# Patient Record
Sex: MALE | Race: ASIAN | Employment: STUDENT | URBAN - METROPOLITAN AREA
[De-identification: names, ages, dates, MRNs, and addresses within clinical notes are randomized per-mention and may not be internally consistent; named-entity substitution may affect disease eponyms.]

---
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
## 2022-02-04 ENCOUNTER — TELEPHONE (OUTPATIENT)
Dept: INTERNAL MEDICINE CLINIC | Facility: CLINIC | Age: 22
End: 2022-02-04

## 2022-02-04 NOTE — TELEPHONE ENCOUNTER
Mother was requesting order due to family hx of heart disease, reports patient is otherwise healthy. Advised may not be covered, mother verbalized understanding, is asking if order can be approved and copy sent to [facility] and she will follow up with insurance about coverage or out of pocket cost. Pended order for lipid panel signed and sent via [employer].

## 2022-02-04 NOTE — TELEPHONE ENCOUNTER
I am not sure why she wants this checked. Typically we would never check a cholesterol level on someone this young. I have pended the order and we could do it. I cannot guarantee that the insurance will pay for it.

## 2022-03-01 ENCOUNTER — MED REC SCAN ONLY (OUTPATIENT)
Dept: ORTHOPEDICS CLINIC | Facility: CLINIC | Age: 22
End: 2022-03-01

## 2022-03-25 ENCOUNTER — TELEPHONE (OUTPATIENT)
Dept: ORTHOPEDICS CLINIC | Facility: CLINIC | Age: 22
End: 2022-03-25

## 2022-03-25 NOTE — TELEPHONE ENCOUNTER
New PT order placed.   Per Cele Beckwith at [facility] PT, faxed to [phone]  Confirmed fax sent

## 2022-03-25 NOTE — TELEPHONE ENCOUNTER
Patient did not start PT back in February because he was in school. Please send a new PT RX to [facility] @  Fax [phone]. Patient is coming for his 1st PT visit today. Thank you!

## 2022-05-06 ENCOUNTER — TELEPHONE (OUTPATIENT)
Dept: ORTHOPEDICS CLINIC | Facility: CLINIC | Age: 22
End: 2022-05-06

## 2022-05-06 NOTE — TELEPHONE ENCOUNTER
Please advise if OK to be seen for both the knee and ankle for same visit. Also, would you need new imaging of the knee, or eval first? Thank you!

## 2022-05-06 NOTE — TELEPHONE ENCOUNTER
Patient wants to schedule a follow up for his RT knee. Last visit was on 03/22/2021. He also wants to be seen for a new problem with his RT ankle. Please advise if patient can be seen at once for both body parts. If so, please advise if imaging will be needed. Thank you!

## 2022-05-09 NOTE — TELEPHONE ENCOUNTER
Nanda Post MD  You 3 days ago     Sounds good! Okay to see for both. No new knee imaging needed. Ankle XR recommended. Ankle xrays placed. Pt can be scheduled for both joints, per Dr. Tyrone Mayen. Thank you.

## 2022-05-18 ENCOUNTER — HOSPITAL ENCOUNTER (OUTPATIENT)
Dept: GENERAL RADIOLOGY | Age: 22
Discharge: HOME | End: 2022-05-18
Attending: ORTHOPAEDIC SURGERY
Payer: COMMERCIAL

## 2022-05-18 ENCOUNTER — OFFICE VISIT (OUTPATIENT)
Dept: ORTHOPEDICS CLINIC | Facility: CLINIC | Age: 22
End: 2022-05-18
Payer: COMMERCIAL

## 2022-05-18 DIAGNOSIS — Z98.890 S/P ACL RECONSTRUCTION: Primary | ICD-10-CM

## 2022-05-18 DIAGNOSIS — M25.572 LEFT ANKLE PAIN, UNSPECIFIED CHRONICITY: ICD-10-CM

## 2022-05-18 DIAGNOSIS — S93.492A SPRAIN OF ANTERIOR TALOFIBULAR LIGAMENT OF LEFT ANKLE, INITIAL ENCOUNTER: ICD-10-CM

## 2022-05-18 PROCEDURE — 73610 X-RAY EXAM OF ANKLE: CPT | Performed by: ORTHOPAEDIC SURGERY

## 2022-05-18 PROCEDURE — 99214 OFFICE O/P EST MOD 30 MIN: CPT | Performed by: ORTHOPAEDIC SURGERY

## 2023-01-13 ENCOUNTER — TELEPHONE (OUTPATIENT)
Dept: INTERNAL MEDICINE CLINIC | Facility: CLINIC | Age: 23
End: 2023-01-13

## 2023-01-13 NOTE — TELEPHONE ENCOUNTER
On call     Per patient mother  , son is present at the time of conversation , he had some stomach ache this morning and took  antacid medication so felt better  , but now he is experiencing some rash under both armpits ,mostly on the left side that is spreading down on the rib area-the    rad rash fadumo arm area left arm - forearm -Per patient arm feels feeling heavy / have some  numbness .   Was exercising today and feels  recommend patient to go to immediate care for evaluation  And treatment patient mother agrees and will take patient to  immediate care now -->  ER if indicated

## 2023-02-28 RX ORDER — MONTELUKAST SODIUM 10 MG/1
10 TABLET ORAL NIGHTLY
Qty: 90 TABLET | Refills: 1 | Status: SHIPPED | OUTPATIENT
Start: 2023-02-28

## 2023-02-28 RX ORDER — FLUTICASONE PROPIONATE 50 MCG
2 SPRAY, SUSPENSION (ML) NASAL DAILY
Qty: 3 EACH | Refills: 1 | Status: SHIPPED | OUTPATIENT
Start: 2023-02-28

## 2023-02-28 NOTE — TELEPHONE ENCOUNTER
Mom requesting refills for pt from Dr Karo Bruno of:    Singulair   Flonase    Send to:  [facility] [address] [facility], [address] [facility],[address]

## 2023-02-28 NOTE — TELEPHONE ENCOUNTER
Per mom of patient she made an appointment for patient on 03/16/2023 during his spring break but for the meantime patient would like to know if can send rx med until he can see APRN on 03/16/2023.

## 2023-02-28 NOTE — TELEPHONE ENCOUNTER
Refill passed per [address] [place] [address] protocol. Requested Prescriptions   Pending Prescriptions Disp Refills    montelukast (SINGULAIR) 10 MG Oral Tab 90 tablet 1     Sig: Take 1 tablet (10 mg total) by mouth nightly. Asthma & COPD Medication Protocol Passed - 2023  2:24 PM        Passed - In person appointment or virtual visit in the past 6 mos or appointment in next 3 mos     Recent Outpatient Visits              9 months ago S/P ACL reconstruction    [address], [place] Timmy Hensley MD    Office Visit    1 year ago [facility] health care    [address], [address], [place]    Nurse Only    1 year ago Cerumen debris on tympanic membrane of both ears    [address] [facility] [place], [place], APR    Office Visit    1 year ago Routine physical examination    Sahil Griggs MD    Office Visit    1 year ago S/P ACL reconstruction    [address], [place] Darcy Hensley MD    Office Visit          Future Appointments         Provider Department Appt Notes    In 2 weeks KALPANA Ibarra [facility], [address] [facility] px \"policy informed\"                 fluticasone propionate (FLONASE) 50 MCG/ACT Nasal Suspension 3 each 3     Si sprays by Nasal route daily.        Allergy Medication Protocol Passed - 2023  2:24 PM        Passed - In person appointment or virtual visit in the past 12 mos or appointment in next 3 mos     Recent Outpatient Visits              9 months ago S/P ACL reconstruction    [address], [place] Timmy Hensley MD    Office Visit    1 year ago [facility] care    [address], [address] [facility], [place]    Nurse Only    1 year ago Cerumen debris on tympanic membrane of both ears    [address] [facility] [place], [place] Vásquez, KALPANA    Office Visit    1 year ago Routine physical examination    [address] [facility], Tone Zamora MD    Office Visit    1 year ago S/P ACL reconstruction    [address] [place] Luis Alberto Hensley MD    Office Visit          Future Appointments         Provider Department Appt Notes    In 2 weeks KALPANA [facility], [address] [facility] [place], [place] px Adrian Webber informed\"

## 2023-10-05 ENCOUNTER — TELEPHONE (OUTPATIENT)
Dept: INTERNAL MEDICINE CLINIC | Facility: CLINIC | Age: 23
End: 2023-10-05

## 2023-10-05 NOTE — TELEPHONE ENCOUNTER
[facility]    Requesting Dr Wolfe order 4 labs for pt:    Cholesterol  Ferritin  Iron  Vitamin D

## 2023-10-06 NOTE — TELEPHONE ENCOUNTER
Patient needs to schedule a visit with Dr Wolfe and lab work can be discussed at the time of the visit.

## 2023-10-06 NOTE — TELEPHONE ENCOUNTER
Left message to call back
MOM RT RN CALL
Mom states that pt needs meningitis vaccine before wcc, okay to book?
Needs to come for meningitis vaccine this month explained can wait and get at well visit but he needs so he can get forms turned in for this he can get forms for [employer] ,ok to give as nurse visit, ordered  Per protocol, will need to eat/drink prior to
urinary symptoms

## 2024-02-15 ENCOUNTER — OFFICE VISIT (OUTPATIENT)
Dept: INTERNAL MEDICINE CLINIC | Facility: CLINIC | Age: 24
End: 2024-02-15

## 2024-02-15 VITALS
OXYGEN SATURATION: 100 % | DIASTOLIC BLOOD PRESSURE: 75 MMHG | SYSTOLIC BLOOD PRESSURE: 125 MMHG | TEMPERATURE: 97 F | BODY MASS INDEX: 26.04 KG/M2 | HEIGHT: 71 IN | HEART RATE: 74 BPM | WEIGHT: 186 LBS

## 2024-02-15 DIAGNOSIS — J30.9 ALLERGIC RHINITIS, UNSPECIFIED SEASONALITY, UNSPECIFIED TRIGGER: Primary | ICD-10-CM

## 2024-02-15 DIAGNOSIS — R05.1 ACUTE COUGH: ICD-10-CM

## 2024-02-15 PROCEDURE — 99213 OFFICE O/P EST LOW 20 MIN: CPT | Performed by: NURSE PRACTITIONER

## 2024-02-15 PROCEDURE — 3074F SYST BP LT 130 MM HG: CPT | Performed by: NURSE PRACTITIONER

## 2024-02-15 PROCEDURE — 3078F DIAST BP <80 MM HG: CPT | Performed by: NURSE PRACTITIONER

## 2024-02-15 PROCEDURE — 3008F BODY MASS INDEX DOCD: CPT | Performed by: NURSE PRACTITIONER

## 2024-02-15 RX ORDER — FLUTICASONE PROPIONATE 50 MCG
2 SPRAY, SUSPENSION (ML) NASAL DAILY
Qty: 3 EACH | Refills: 1 | Status: SHIPPED | OUTPATIENT
Start: 2024-02-15

## 2024-02-15 RX ORDER — MONTELUKAST SODIUM 10 MG/1
10 TABLET ORAL DAILY
Qty: 90 TABLET | Refills: 1 | Status: SHIPPED | OUTPATIENT
Start: 2024-02-15

## 2024-02-15 NOTE — PATIENT INSTRUCTIONS
Plan    Zyrtec 10mg at night    Simply saline mist at night    Flonase in the morning    Singulair in the morning

## 2024-02-15 NOTE — PROGRESS NOTES
HPI:    Patient ID: Jesica Mendez is a 24 year old male.    HPI Cough  24 year old male complaining of cough.  In December he was ill. Since then he has been clearing his throat and sometimes coughing with a congested cough.    He saw ENT (Dr. Souza)- He was put on the following:  He was on singular and flonase has helped in the past.      Immunization History   Administered Date(s) Administered    Covid-19 Vaccine Pfizer 30 mcg/0.3 ml 04/09/2021, 04/30/2021    DTAP 03/12/2002, 05/06/2002, 08/27/2002, 06/24/2003, 04/17/2007    DTAP INFANRIX 03/12/2002, 05/06/2002, 08/27/2002, 06/24/2003, 04/17/2007    FLULAVAL 6 months & older 0.5 ml Prefilled syringe (50061) 04/04/2019, 10/09/2020    FLUMIST Intranasal Influenza 11/20/2014    FLUMIST NASAL 2 YR-49 YRS (67494) 10/12/2013, 11/20/2014    FLUZONE 6 months and older PFS 0.5 ml (07243) 04/04/2019, 10/09/2020    HEP A 11/29/2005, 04/17/2007    HEP A,Ped/Adol,(2 Dose) 04/17/2007    HEP B 02/12/2002, 03/12/2002, 08/27/2002    HEP B, Adult 06/14/2021    HEP B, Ped/Adol 02/12/2002, 03/12/2002, 08/27/2002    HIB 03/12/2002, 05/06/2002, 08/27/2002, 05/03/2003    Hib, Unspecified Formulation 03/12/2002, 05/06/2002, 08/27/2002, 05/03/2003    IPV 03/12/2002, 05/06/2002, 08/27/2002, 04/17/2007    Influenza 11/08/2005, 10/06/2009, 10/06/2012    Influenza Vaccine, Preserv Free 11/08/2005, 10/06/2009, 10/12/2013    MMR 05/03/2003, 05/03/2003, 04/17/2007, 04/17/2007    Meningococcal (Menomune) 10/12/2013    Meningococcal-Menactra 03/15/2018    Meningococcal-Menveo 2month-55yr 10/12/2013, 03/15/2018    Pneumococcal (Prevnar 7) 04/09/2002, 06/17/2002, 10/24/2002, 06/24/2003    Pneumococcal Vaccine, Conjugate 04/09/2002, 06/17/2002, 10/24/2002, 06/24/2003    TDAP 10/06/2012    Varicella 02/03/2003, 04/17/2007       Past Medical History:   Diagnosis Date    Allergic rhinitis     Finger fracture, left 2015    Foreign body in ear 2008    per ng      Past Surgical History:   Procedure  Laterality Date    Other surgical history      L finger fracture, pinned    Other surgical history Left 12/18/2020    RT ACL W/ MD DARYA      Social History     Socioeconomic History    Marital status: Single   Tobacco Use    Smoking status: Passive Smoke Exposure - Never Smoker    Smokeless tobacco: Never   Vaping Use    Vaping Use: Never used   Substance and Sexual Activity    Alcohol use: Never    Drug use: Never    Sexual activity: Not Currently     Partners: Female   Other Topics Concern    Pt has a pacemaker No    Pt has a defibrillator No    Reaction to local anesthetic No          Review of Systems   Constitutional:  Negative for chills, fatigue and fever.   HENT:  Negative for ear pain, hearing loss, sinus pain, sore throat and trouble swallowing.    Eyes:  Negative for pain and visual disturbance.   Respiratory:  Positive for cough. Negative for chest tightness and shortness of breath.    Cardiovascular:  Negative for chest pain, palpitations and leg swelling.   Gastrointestinal:  Negative for abdominal pain, constipation, diarrhea, nausea and vomiting.   Endocrine: Negative for cold intolerance and heat intolerance.   Genitourinary:  Negative for dysuria and hematuria.   Musculoskeletal:  Negative for back pain and joint swelling.   Skin:  Negative for rash.   Allergic/Immunologic: Negative for environmental allergies.   Neurological:  Negative for weakness, numbness and headaches.   Hematological:  Does not bruise/bleed easily.   Psychiatric/Behavioral:  Negative for dysphoric mood and sleep disturbance. The patient is not nervous/anxious.               Current Outpatient Medications   Medication Sig Dispense Refill    montelukast (SINGULAIR) 10 MG Oral Tab Take 1 tablet (10 mg total) by mouth daily. 90 tablet 1    fluticasone propionate (FLONASE) 50 MCG/ACT Nasal Suspension 2 sprays by Nasal route daily. 3 each 1     Allergies:No Known Allergies   PHYSICAL EXAM:   Physical Exam  Constitutional:        Appearance: Normal appearance. He is well-developed.   HENT:      Head: Normocephalic.      Right Ear: Tympanic membrane normal.      Left Ear: Tympanic membrane normal.      Nose: Nose normal.      Mouth/Throat:      Mouth: Mucous membranes are moist.      Pharynx: No oropharyngeal exudate or posterior oropharyngeal erythema.   Eyes:      General:         Right eye: No discharge.         Left eye: No discharge.      Pupils: Pupils are equal, round, and reactive to light.   Cardiovascular:      Rate and Rhythm: Normal rate and regular rhythm.      Heart sounds: Normal heart sounds. No murmur heard.     No friction rub. No gallop.   Pulmonary:      Effort: Pulmonary effort is normal. No respiratory distress.      Breath sounds: Normal breath sounds. No wheezing, rhonchi or rales.   Abdominal:      General: Bowel sounds are normal. There is no distension.      Palpations: Abdomen is soft. There is no mass.      Tenderness: There is no abdominal tenderness. There is no right CVA tenderness, left CVA tenderness or guarding.   Musculoskeletal:         General: No tenderness.      Cervical back: Normal range of motion and neck supple. No tenderness.      Right lower leg: No edema.      Left lower leg: No edema.   Lymphadenopathy:      Cervical: No cervical adenopathy.   Skin:     General: Skin is warm and dry.      Findings: No rash.   Neurological:      Mental Status: He is alert and oriented to person, place, and time.      Coordination: Coordination normal.      Gait: Gait normal.   Psychiatric:         Mood and Affect: Mood normal.         Behavior: Behavior normal.         Thought Content: Thought content normal.         Judgment: Judgment normal.       /75 (BP Location: Left arm, Patient Position: Sitting, Cuff Size: adult)   Pulse 74   Temp 96.6 °F (35.9 °C) (Temporal)   Ht 5' 11\" (1.803 m)   Wt 186 lb (84.4 kg)   SpO2 100%   BMI 25.94 kg/m²   Wt Readings from Last 2 Encounters:   02/15/24 186 lb (84.4  kg)   21 177 lb 8 oz (80.5 kg) (80%, Z= 0.83)*     * Growth percentiles are based on CDC (Boys, 2-20 Years) data.     Body mass index is 25.94 kg/m².(2)  No results found for: \"WBC\", \"RBC\", \"HGB\", \"HCT\", \"MCV\", \"MCH\", \"MCHC\", \"RDW\", \"PLT\", \"MPV\"   No results found for: \"GLU\", \"BUN\", \"BUNCREA\", \"CREATSERUM\", \"ANIONGAP\", \"GFR\", \"GFRNAA\", \"GFRAA\", \"CA\", \"OSMOCALC\", \"ALKPHO\", \"AST\", \"ALT\", \"ALKPHOS\", \"BILT\", \"TP\", \"ALB\", \"GLOBULIN\", \"AGRATIO\", \"NA\", \"K\", \"CL\", \"CO2\"   No results found for: \"EAG\", \"A1C\"   No results found for: \"CHOLEST\", \"TRIG\", \"HDL\", \"LDL\", \"VLDL\", \"TCHDLRATIO\", \"NONHDLC\", \"CHOLHDLRATIO\", \"CALCNONHDL\"   No results found for: \"T4F\", \"TSH\", \"TSHT4\"             ASSESSMENT/PLAN:     Problem List Items Addressed This Visit       Acute cough     Cough- Most likely allergies.    Plan    Zyrtec 10mg at night    Simply saline mist at night    Flonase in the morning    Singulair in the morning    Labs- Food allergy panel and CBC, CMP    Patient to call if symptoms do not improve.  Consider chest x-ray if cough continues.         Relevant Medications    montelukast (SINGULAIR) 10 MG Oral Tab    Allergic rhinitis - Primary    Relevant Medications    montelukast (SINGULAIR) 10 MG Oral Tab    fluticasone propionate (FLONASE) 50 MCG/ACT Nasal Suspension    Other Relevant Orders    Adult Food Allergy Prof [E]    CBC, Platelet, No Differential [E]    Lipid Panel [E]    Comp Metabolic Panel (14)          Orders Placed This Encounter   Procedures    Adult Food Allergy Prof [E]    CBC, Platelet, No Differential [E]    Lipid Panel [E]    Comp Metabolic Panel (14)       Meds This Visit:  Requested Prescriptions     Signed Prescriptions Disp Refills    montelukast (SINGULAIR) 10 MG Oral Tab 90 tablet 1     Sig: Take 1 tablet (10 mg total) by mouth daily.    fluticasone propionate (FLONASE) 50 MCG/ACT Nasal Suspension 3 each 1     Si sprays by Nasal route daily.       Imaging & Referrals:  None         Juana Haas,  APRN

## 2024-02-16 NOTE — ASSESSMENT & PLAN NOTE
Called pt. Cough- Most likely allergies.    Plan    Zyrtec 10mg at night    Simply saline mist at night    Flonase in the morning    Singulair in the morning    Labs- Food allergy panel and CBC, CMP    Patient to call if symptoms do not improve.  Consider chest x-ray if cough continues.

## 2024-02-28 ENCOUNTER — LAB ENCOUNTER (OUTPATIENT)
Dept: LAB | Facility: HOSPITAL | Age: 24
End: 2024-02-28
Attending: NURSE PRACTITIONER
Payer: COMMERCIAL

## 2024-02-28 LAB
ALBUMIN SERPL-MCNC: 4.6 G/DL (ref 3.2–4.8)
ALBUMIN/GLOB SERPL: 1.5 {RATIO} (ref 1–2)
ALP LIVER SERPL-CCNC: 61 U/L
ALT SERPL-CCNC: 33 U/L
ANION GAP SERPL CALC-SCNC: 4 MMOL/L (ref 0–18)
AST SERPL-CCNC: 16 U/L (ref ?–34)
BILIRUB SERPL-MCNC: 0.5 MG/DL (ref 0.3–1.2)
BUN BLD-MCNC: 14 MG/DL (ref 9–23)
BUN/CREAT SERPL: 14 (ref 10–20)
CALCIUM BLD-MCNC: 9.5 MG/DL (ref 8.7–10.4)
CHLORIDE SERPL-SCNC: 108 MMOL/L (ref 98–112)
CO2 SERPL-SCNC: 30 MMOL/L (ref 21–32)
CREAT BLD-MCNC: 1 MG/DL
DEPRECATED RDW RBC AUTO: 35.6 FL (ref 35.1–46.3)
EGFRCR SERPLBLD CKD-EPI 2021: 109 ML/MIN/1.73M2 (ref 60–?)
ERYTHROCYTE [DISTWIDTH] IN BLOOD BY AUTOMATED COUNT: 11.8 % (ref 11–15)
FASTING STATUS PATIENT QL REPORTED: NO
GLOBULIN PLAS-MCNC: 3.1 G/DL (ref 2.8–4.4)
GLUCOSE BLD-MCNC: 97 MG/DL (ref 70–99)
HCT VFR BLD AUTO: 44.6 %
HGB BLD-MCNC: 15.1 G/DL
MCH RBC QN AUTO: 28.2 PG (ref 26–34)
MCHC RBC AUTO-ENTMCNC: 33.9 G/DL (ref 31–37)
MCV RBC AUTO: 83.2 FL
OSMOLALITY SERPL CALC.SUM OF ELEC: 294 MOSM/KG (ref 275–295)
PLATELET # BLD AUTO: 227 10(3)UL (ref 150–450)
POTASSIUM SERPL-SCNC: 4.1 MMOL/L (ref 3.5–5.1)
PROT SERPL-MCNC: 7.7 G/DL (ref 5.7–8.2)
RBC # BLD AUTO: 5.36 X10(6)UL
SODIUM SERPL-SCNC: 142 MMOL/L (ref 136–145)
WBC # BLD AUTO: 8.7 X10(3) UL (ref 4–11)

## 2024-02-28 PROCEDURE — 36415 COLL VENOUS BLD VENIPUNCTURE: CPT | Performed by: NURSE PRACTITIONER

## 2024-02-28 PROCEDURE — 86003 ALLG SPEC IGE CRUDE XTRC EA: CPT | Performed by: NURSE PRACTITIONER

## 2024-02-28 PROCEDURE — 80053 COMPREHEN METABOLIC PANEL: CPT | Performed by: NURSE PRACTITIONER

## 2024-02-28 PROCEDURE — 85027 COMPLETE CBC AUTOMATED: CPT | Performed by: NURSE PRACTITIONER

## 2024-02-28 PROCEDURE — 82785 ASSAY OF IGE: CPT | Performed by: NURSE PRACTITIONER

## 2024-02-29 LAB
ALLERGEN BRAZIL NUT: <0.1 KUA/L (ref ?–0.1)
ALMOND IGE QN: <0.1 KUA/L (ref ?–0.1)
CASHEW NUT IGE QN: <0.1 KUA/L (ref ?–0.1)
CLAM IGE QN: <0.1 KUA/L (ref ?–0.1)
CODFISH IGE QN: <0.1 KUA/L (ref ?–0.1)
CORN IGE QN: <0.1 KUA/L (ref ?–0.1)
COW MILK IGE QN: <0.1 KUA/L (ref ?–0.1)
EGG WHITE IGE QN: <0.1 KUA/L (ref ?–0.1)
HAZELNUT IGE QN: <0.1 KUA/L (ref ?–0.1)
IGE SERPL-ACNC: 58.2 KU/L (ref 2–214)
PEANUT IGE QN: <0.1 KUA/L (ref ?–0.1)
SALMON IGE QN: <0.1 KUA/L (ref ?–0.1)
SCALLOP IGE QN: <0.1 KUA/L (ref ?–0.1)
SESAME SEED IGE QN: <0.1 KUA/L (ref ?–0.1)
SHRIMP IGE QN: <0.1 KUA/L (ref ?–0.1)
SOYBEAN IGE QN: <0.1 KUA/L (ref ?–0.1)
WALNUT IGE QN: <0.1 KUA/L (ref ?–0.1)
WHEAT IGE QN: <0.1 KUA/L (ref ?–0.1)

## (undated) DIAGNOSIS — M25.571 RIGHT ANKLE PAIN, UNSPECIFIED CHRONICITY: Primary | ICD-10-CM

## (undated) DIAGNOSIS — Z98.890 S/P ACL RECONSTRUCTION: Primary | ICD-10-CM

## (undated) DIAGNOSIS — Z13.220 SCREENING CHOLESTEROL LEVEL: Primary | ICD-10-CM

## (undated) DEVICE — DUAL SPIKE ADAPTER: Brand: CONMED

## (undated) DEVICE — LAPAROTOMY SPONGE - RF AND X-RAY DETECTABLE PRE-WASHED: Brand: SITUATE

## (undated) DEVICE — FAST-FIX 360 STRAIGHT KNOT                                    PUSHER/CUTTER AND SLOTTED CANNULA SETS: Brand: FAST-FIX

## (undated) DEVICE — SPONGE RAYTEC 4X4 RF DETECT

## (undated) DEVICE — MEDI-VAC NON-CONDUCTIVE SUCTION TUBING: Brand: CARDINAL HEALTH

## (undated) DEVICE — 7 MM CLEAR-TRAC SCREW CANNULA, LATEX-FREE: Brand: CLEAR-TRAC

## (undated) DEVICE — ACL GRAFT KNIFE 10MM

## (undated) DEVICE — BLADE SAW KM33-11

## (undated) DEVICE — Device

## (undated) DEVICE — [AGGRESSIVE PLUS CUTTER, ARTHROSCOPIC SHAVER BLADE,  DO NOT RESTERILIZE,  DO NOT USE IF PACKAGE IS DAMAGED,  KEEP DRY,  KEEP AWAY FROM SUNLIGHT]: Brand: FORMULA

## (undated) DEVICE — KNEE ARTHROSCOPY CDS: Brand: MEDLINE INDUSTRIES, INC.

## (undated) DEVICE — SUTURE FIBERLOOP #2

## (undated) DEVICE — VIOLET BRAIDED (POLYGLACTIN 910), SYNTHETIC ABSORBABLE SUTURE: Brand: COATED VICRYL

## (undated) DEVICE — SUTURE ETHIBOND 2 OS-4

## (undated) DEVICE — SUTURE VICRYL 3-0 SH

## (undated) DEVICE — COVER,MAYO STAND,STERILE: Brand: MEDLINE

## (undated) DEVICE — SUTURE TIGERLOOP #2

## (undated) DEVICE — STERILE HOOK LOCK LATEX FREE ELASTIC BANDAGE 6INX5YD: Brand: HOOK LOCK™

## (undated) DEVICE — MEDI-VAC SUCTION HANDLE REGULAR CAPACITY: Brand: CARDINAL HEALTH

## (undated) DEVICE — STERILE POLYISOPRENE POWDER-FREE SURGICAL GLOVES WITH EMOLLIENT COATING: Brand: PROTEXIS

## (undated) DEVICE — #15 STERILE STAINLESS BLADE: Brand: STERILE STAINLESS BLADES

## (undated) DEVICE — DRESSING AQUACEL AG 3.5 X 6

## (undated) DEVICE — ACL ADD ON PACK-LF: Brand: MEDLINE INDUSTRIES, INC.

## (undated) DEVICE — #10 STERILE BLADE: Brand: POLYMER COATED BLADES

## (undated) DEVICE — SUTURE MONOCRYL 3-0 PS-2

## (undated) DEVICE — PADDING CAST COTTON  4

## (undated) DEVICE — ELECTRODE TUNG MICRO STR 3CM

## (undated) DEVICE — NOVOCUT SUTURE MANAGER: Brand: NOVOCUT

## (undated) DEVICE — TUBING IRR 16FT CNT WV 3 ASCP

## (undated) DEVICE — 1.2 RAP-PAC B LATEX FREE: Brand: RAP-PAC

## (undated) DEVICE — HOOK LOCK LATEX FREE ELASTIC BANDAGE 4INX5YD

## (undated) DEVICE — SUTURE FIBERWIRE S AR-7200

## (undated) DEVICE — DERMABOND LIQUID ADHESIVE

## (undated) DEVICE — ZIMMER® STERILE DISPOSABLE TOURNIQUET CUFF WITH PLC, DUAL PORT, SINGLE BLADDER, 34 IN. (86 CM)

## (undated) DEVICE — 2.4 MM FLEXIBLE PASSING PINS,                                    STERILE 5 PER PACKAGE: Brand: ENDOBUTTON

## (undated) DEVICE — SUTURE VICRYL 0 UR-6

## (undated) DEVICE — BULB SYRINGE,IRRIGATION WITH PROTECTIVE CAP: Brand: DOVER

## (undated) DEVICE — 2.4 MM X 12 INCH DRILL-TIP GUIDE WIRE

## (undated) DEVICE — DEVICE FLUID REMOVAL-WATER BUG

## (undated) DEVICE — KENDALL SCD EXPRESS SLEEVES, KNEE LENGTH, MEDIUM: Brand: KENDALL SCD

## (undated) DEVICE — STERILE SYNTHETIC POLYISOPRENE POWDER-FREE SURGICAL GLOVES WITH HYDROGEL COATING, SMOOTH FINISH, STRAIGHT FINGER: Brand: PROTEXIS

## (undated) DEVICE — [RESECTOR CUTTER, ARTHROSCOPIC SHAVER BLADE,  DO NOT RESTERILIZE,  DO NOT USE IF PACKAGE IS DAMAGED,  KEEP DRY,  KEEP AWAY FROM SUNLIGHT]: Brand: FORMULA

## (undated) DEVICE — TOWEL OR BLU 16X26 STRL

## (undated) DEVICE — 3M™ STERI-STRIP™ REINFORCED ADHESIVE SKIN CLOSURES, R1547, 1/2 IN X 4 IN (12 MM X 100 MM), 6 STRIPS/ENVELOPE: Brand: 3M™ STERI-STRIP™

## (undated) DEVICE — SUPER TURBOVAC 90 IFS: Brand: COBLATION

## (undated) DEVICE — AGGRESSIVE PLUS, ANGLED CUTTER: Brand: FORMULA

## (undated) DEVICE — 3M™ IOBAN™ 2 ANTIMICROBIAL INCISE DRAPE 6648EZ: Brand: IOBAN™ 2

## (undated) DEVICE — ACL GRAFT KNIFE 9MM

## (undated) DEVICE — SOL  .9 3000ML

## (undated) DEVICE — TUBING DW OUTFLOW

## (undated) DEVICE — 3M™ STERI-DRAPE™ INSTRUMENT POUCH 1018: Brand: STERI-DRAPE™

## (undated) DEVICE — CAUTERY PENCIL

## (undated) DEVICE — SUTURE ETHIBOND 5 V-37

## (undated) DEVICE — UNDYED BRAIDED (POLYGLACTIN 910), SYNTHETIC ABSORBABLE SUTURE: Brand: COATED VICRYL

## (undated) DEVICE — STERILE POLYISOPRENE POWDER-FREE SURGICAL GLOVES: Brand: PROTEXIS

## (undated) DEVICE — 3M™ TEGADERM™ TRANSPARENT FILM DRESSING, 1626W, 4 IN X 4-3/4 IN (10 CM X 12 CM), 50 EACH/CARTON, 4 CARTON/CASE: Brand: 3M™ TEGADERM™

## (undated) NOTE — MR AVS SNAPSHOT
[place]  [address]  [phone]               Thank you for choosing us for your health care visit with Julieta Zavala MD.  We are glad to serve you and happy to provide you with this summa reimbursement difficulties for you.         Single wart R forehead; flat warts behind ear    Assoc Dx:  Filiform wart [B07.9]          Reason for Today's Visit     Rash           Medical Issues Discussed Today     Filiform wart    -  Primary    Closed comed For medical emergencies, dial 911.                Visit [facility] online at  [email or web address]tn

## (undated) NOTE — MR AVS SNAPSHOT
[facility] [facility]  [phone]               Thank you for choosing us for your health care visit with Lavern Salmon DO.   We are glad to serve you and happy to provide you with this sum Fact Sheet: Healthy Active Living for Families    Healthy nutrition starts as early as infancy with breastfeeding. Once your baby begins eating solid foods, introduce nutritious foods early on and often.  Sometimes toddlers need to try a food 10 times befor

## (undated) NOTE — LETTER
[facility] [employer] of Child Health Examination       Student's Name  Rhonda Velásquez Signature                                                                                                                                   Title                           Date     Signature Male School   Grade Level/ID#  12th Grade   HEALTH HISTORY          TO BE COMPLETED AND SIGNED BY PARENT/GUARDIAN AND VERIFIED BY HEALTH CARE PROVIDER    ALLERGIES  (Food, drug, insect, other)  Patient has no known allergies.  MEDICATION  (List all prescrib PHYSICAL EXAMINATION REQUIREMENTS    Entire section below to be completed by MD/DO/APN/PA       PHYSICAL EXAMINATION REQUIREMENTS (head circumference if <33 years old):   /67   Pulse 65   Ht 5' 11\" (1.803 m)   Wt 68.1 kg (150 lb 3.2 oz)   BMI 20.95 Mouth/Dental Yes  Spinal examination Yes    Cardiovascular/HTN Yes  Nutritional status Yes    Respiratory Yes                   Diagnosis of Asthma: No Mental Health Yes        Currently Prescribed Asthma Medication:            Quick-relief  medication (e.

## (undated) NOTE — LETTER
Name:  Terrence Kaplan Year:  12th Grade Class: Student ID No.:   Address:  [address] [place] [address] Phone:  [phone] (home)  :  14year old   Name Relationship [address] Work Phone Home Phone Mobile Phone   1. John Echevarria 13. Does anyone in your family have a heart problem, pacemaker, or implanted defibrillator? 12. Has anyone in your family had unexplained fainting, seizures, or near drowning?      BONE AND JOINT QUESTIONS Yes No   17. Have you ever had an injury to a b after being hit or falling? 39.Have you ever been unable to move your arms / legs after being hit /fall? 40. Have you ever become ill while exercising in the heat?     41. Do you get frequent muscle cramps when exercising? 42.  Do you or someone · Murmurs (auscultation standing, supine, +/- Valsalva)  · Location of point of maximal impulse (PMI) Yes    Pulses Yes    Lungs Yes    Abdomen Yes    Genitourinary (males only)* Yes    Skin:  HSV, lesions suggestive of MRSA, tinea corporis Yes    Neurolog may be asked to submit to testing for the presence of performance-enhancing substances in my/his/her body either during [employer] state series events or during the school day, and I/our student do/does hereby agree to submit to such testing and analysis by a ce

## (undated) NOTE — LETTER
[address] of 1   Account #:   Order Date: 2/4/2022           EPIC ORD #:   Order Time:  5:41 PM               Client / Ordering Site Information: Physician Information:   Account Name:     Address 1:     Address 2:     [place], [place] Zip:     Phone:     Fax:      Ordering: Vania Cha   Degree: MD   NPI: [id number]   UPIN:     Physician ID:           Patient Information:   Name: Nieves Harada   Gender: Male   SSN: [id number]   Patient ID: [id number]    YOB: 2000 (22 years)   Phone: [phone]   Address: [place] [phone] Insurance: COMMERCIAL Sec.  Insurance: MEDICAID   Ins Code: COMMERCIAL Ins Code: MEDICAID   Plan: COMMERCIAL Plan: MEDICAID   Address: No address on file Address: [address] [facility] [address]   Policy #: [id number] Group #: [id number] Policy #: [id number] Group #: [id number]        Order: LIPID PANEL (LIPID)   Order Priority: Routine   Order class: Normal   Specimen source:   Comments:   Status:   Count:    Interval:   Questions:         Responsible Party / Guarantor Information:   Name: Nieves Harada   Address: [address] [place] [address] Zip: [address]   Phone: [phone]   Relation to Pt: Self   Employer Name:           ABN:      Worker's Comp: N Date of Injury:              Diagnosis Codes:   F18.732                   SIGNED ORDER ON FILE  Authorizing Provider: Vania Cha MD   Date: Feb 4, 2022 at 5:41 PM  Ordering Department: [facility] NURSE TRIAGE [facility]

## (undated) NOTE — LETTER
5/2/2018              Belem Barclay        [address] [address]         Immunization History  Administered    Dates Administered   .  Meningococcal (Menactra/Menveo)                   10/12/2013,  03/15/2018      Sincerely,    Nikko

## (undated) NOTE — Clinical Note
[facility] [employer] Child Health Examination       Student's Name  Zacarias Bending Birth Title                           Date    (If adding dates to the above immunization history section, put your initials by date(s) and sign here.)   ALTERNATIVE PROOF OF IMMUNITY   1 Diagnosis of asthma? Child wakes during the night coughing   Yes       No    Yes       No    Loss of function of one of paired organs? (eye/ear/kidney/testicle)   Yes       No      Birth Defects? Developmental delay?    Yes       No    Yes       No  Hospi Family History                    Ethnic Minority                             Signs of Insulin Resistance (hypertension, dyslipidemia, polycystic ovarian syndrome, acanthosis nigricans)                           At Risk     Lead Risk Questionnaire  Req'd f NEEDS/MODIFICATIONS required in the school setting  None DIETARY Needs/Restrictions     None   SPECIAL INSTRUCTIONS/DEVICES e.g. safety glasses, glass eye, chest protector for arrhythmia, pacemaker, prosthetic device, dental bridge, false teeth, athleticsu

## (undated) NOTE — MR AVS SNAPSHOT
[facility] [place] [address] [address] [id number] [phone]               Thank you for choosing us for your health care visit with Quentin Little MD.  We are glad to serve you and happy to provide you with this summary of

## (undated) NOTE — Clinical Note
Name:  Melissa Chirinos  School Year:  11th Grade Class: Student ID No.:   Address:  [place] [address] Phone:  [phone] (home)  :  15year old   Name Relationship [address] Work Phone Home Phone Mobile Phone   1. Prmaod Rees 12. Has anyone in your family had unexplained fainting, seizures, or near drowning? BONE AND JOINT QUESTIONS Yes No   17. Have you ever had an injury to a bone, muscle, ligament, or tendon that caused you to miss a practice or a game?      18. Have you /fall?     36. Have you ever become ill while exercising in the heat?     41. Do you get frequent muscle cramps when exercising? 42. Do you or someone in your family have sickle cell trait or disease? 43.  Have you ever had any problems with your ey Abdomen Yes    Genitourinary (males only)* {N/A:2593}    Skin:  HSV, lesions suggestive of MRSA, tinea corporis Yes    Neurologic* Yes    MUSCULOSKELETAL     Neck Yes    Back Yes    Shoulder/arm Yes    Elbow/forearm Yes    Wrist/hand/fingers Yes    Hip/thi laboratory.  We further understand and agree that the results of the performance-enhancing substance testing may be provided to certain individuals in my/our student’s high school as specified in the [facility] Performance-Enhancing Substance Testing Program Prot

## (undated) NOTE — LETTER
Patient Name: Franki Escobar  : 2000  MRN: [id number]  Patient Address: [address] [place] [address] [address] [address]      Coronavirus Disease 2019 (COVID-19)     [facility] is committed to the safety and well-being of our patients, members, employees, symptoms get worse, call your healthcare provider immediately. 3. Get rest and stay hydrated.    4. If you have a medical appointment, call the healthcare provider ahead of time and tell them that you have or may have COVID-19.  5. For medical emergencies, medications; and  · Improvement in respiratory symptoms (e.g., cough, shortness of breath); and  · At least 10 days have passed since symptoms first appeared OR if asymptomatic patient or date of symptom onset is unclear then use 10 days post COVID test da must:    · Have had a confirmed diagnosis of COVID-19  · Be symptom-free for at least 14 days*    *Some people will be required to have a repeat COVID-19 test in order to be eligible to donate.  If you’re instructed by Eric Salazar that a repeat test is required Researchers are trying to identify similarities between people with a Post-COVID condition to better understand if there are risk factors. How do I prevent a Post-COVID condition?   The best way to prevent the long-term symptoms of COVID-19 is by preve

## (undated) NOTE — MR AVS SNAPSHOT
[address] [facility]  [phone]               Thank you for choosing us for your health care visit with Nu Harper. DO Luis Antonio.   We are glad to serve you and happy to provide you with this sum An initiative of the American Academy of Pediatrics    Fact Sheet: Healthy Active Living for Families    Healthy nutrition starts as early as infancy with breastfeeding.  Once your baby begins eating solid foods, introduce nutritious foods early on and ofte

## (undated) NOTE — LETTER
Franki Ulrich, [address] [facility] [place] [address]       04/17/19        Patient: Li Monsalve   YOB: 2000   Date of Visit: 4/16/2019       Dear  Dr. Suzie Lisa MD,      Thank you for referring Li Monsalve to my practic